# Patient Record
Sex: MALE | Race: AMERICAN INDIAN OR ALASKA NATIVE | NOT HISPANIC OR LATINO
[De-identification: names, ages, dates, MRNs, and addresses within clinical notes are randomized per-mention and may not be internally consistent; named-entity substitution may affect disease eponyms.]

---

## 2017-01-12 ENCOUNTER — OTHER (OUTPATIENT)
Age: 62
End: 2017-01-12

## 2017-01-18 ENCOUNTER — OUTPATIENT (OUTPATIENT)
Dept: OUTPATIENT SERVICES | Facility: HOSPITAL | Age: 62
LOS: 1 days | Discharge: HOME | End: 2017-01-18

## 2017-01-27 ENCOUNTER — APPOINTMENT (OUTPATIENT)
Dept: CARDIOLOGY | Facility: CLINIC | Age: 62
End: 2017-01-27

## 2017-01-27 VITALS
HEIGHT: 67 IN | DIASTOLIC BLOOD PRESSURE: 74 MMHG | BODY MASS INDEX: 27.47 KG/M2 | WEIGHT: 175 LBS | SYSTOLIC BLOOD PRESSURE: 115 MMHG

## 2017-06-23 ENCOUNTER — APPOINTMENT (OUTPATIENT)
Dept: CARDIOLOGY | Facility: CLINIC | Age: 62
End: 2017-06-23

## 2017-06-23 VITALS
HEART RATE: 74 BPM | SYSTOLIC BLOOD PRESSURE: 118 MMHG | DIASTOLIC BLOOD PRESSURE: 76 MMHG | BODY MASS INDEX: 27.47 KG/M2 | WEIGHT: 175 LBS | HEIGHT: 67 IN

## 2017-06-23 RX ORDER — EZETIMIBE AND SIMVASTATIN 10; 10 MG/1; MG/1
10-10 TABLET ORAL DAILY
Refills: 0 | Status: DISCONTINUED | COMMUNITY
End: 2017-06-23

## 2017-06-27 DIAGNOSIS — I20.9 ANGINA PECTORIS, UNSPECIFIED: ICD-10-CM

## 2017-06-27 DIAGNOSIS — I25.9 CHRONIC ISCHEMIC HEART DISEASE, UNSPECIFIED: ICD-10-CM

## 2017-07-20 ENCOUNTER — OUTPATIENT (OUTPATIENT)
Dept: OUTPATIENT SERVICES | Facility: HOSPITAL | Age: 62
LOS: 1 days | Discharge: HOME | End: 2017-07-20

## 2017-07-20 DIAGNOSIS — Z01.818 ENCOUNTER FOR OTHER PREPROCEDURAL EXAMINATION: ICD-10-CM

## 2017-07-20 DIAGNOSIS — I25.10 ATHEROSCLEROTIC HEART DISEASE OF NATIVE CORONARY ARTERY WITHOUT ANGINA PECTORIS: ICD-10-CM

## 2017-07-27 ENCOUNTER — OUTPATIENT (OUTPATIENT)
Dept: OUTPATIENT SERVICES | Facility: HOSPITAL | Age: 62
LOS: 1 days | Discharge: HOME | End: 2017-07-27

## 2017-08-02 DIAGNOSIS — I10 ESSENTIAL (PRIMARY) HYPERTENSION: ICD-10-CM

## 2017-08-02 DIAGNOSIS — Z82.49 FAMILY HISTORY OF ISCHEMIC HEART DISEASE AND OTHER DISEASES OF THE CIRCULATORY SYSTEM: ICD-10-CM

## 2017-08-02 DIAGNOSIS — R07.89 OTHER CHEST PAIN: ICD-10-CM

## 2017-08-02 DIAGNOSIS — I25.119 ATHEROSCLEROTIC HEART DISEASE OF NATIVE CORONARY ARTERY WITH UNSPECIFIED ANGINA PECTORIS: ICD-10-CM

## 2017-08-02 DIAGNOSIS — E78.4 OTHER HYPERLIPIDEMIA: ICD-10-CM

## 2017-08-14 ENCOUNTER — APPOINTMENT (OUTPATIENT)
Dept: UROLOGY | Facility: CLINIC | Age: 62
End: 2017-08-14
Payer: COMMERCIAL

## 2017-08-14 PROCEDURE — 99213 OFFICE O/P EST LOW 20 MIN: CPT

## 2017-08-14 RX ORDER — TADALAFIL 2.5 MG/1
2.5 TABLET, FILM COATED ORAL
Refills: 0 | Status: DISCONTINUED | COMMUNITY

## 2017-08-14 RX ORDER — EZETIMIBE 10 MG/1
10 TABLET ORAL DAILY
Qty: 30 | Refills: 3 | Status: DISCONTINUED | COMMUNITY
Start: 2017-06-23 | End: 2017-08-14

## 2017-08-14 RX ORDER — ROSUVASTATIN CALCIUM 10 MG/1
10 TABLET, FILM COATED ORAL
Refills: 0 | Status: DISCONTINUED | COMMUNITY

## 2017-10-11 ENCOUNTER — APPOINTMENT (OUTPATIENT)
Dept: CARDIOLOGY | Facility: CLINIC | Age: 62
End: 2017-10-11

## 2017-10-11 VITALS
BODY MASS INDEX: 27.47 KG/M2 | DIASTOLIC BLOOD PRESSURE: 70 MMHG | HEIGHT: 67 IN | HEART RATE: 71 BPM | WEIGHT: 175 LBS | SYSTOLIC BLOOD PRESSURE: 138 MMHG

## 2018-04-10 ENCOUNTER — APPOINTMENT (OUTPATIENT)
Dept: UROLOGY | Facility: CLINIC | Age: 63
End: 2018-04-10

## 2018-04-13 ENCOUNTER — APPOINTMENT (OUTPATIENT)
Dept: CARDIOLOGY | Facility: CLINIC | Age: 63
End: 2018-04-13

## 2018-04-13 VITALS
HEART RATE: 69 BPM | WEIGHT: 174 LBS | HEIGHT: 67 IN | SYSTOLIC BLOOD PRESSURE: 130 MMHG | DIASTOLIC BLOOD PRESSURE: 78 MMHG | BODY MASS INDEX: 27.31 KG/M2

## 2018-05-31 ENCOUNTER — OUTPATIENT (OUTPATIENT)
Dept: OUTPATIENT SERVICES | Facility: HOSPITAL | Age: 63
LOS: 1 days | Discharge: HOME | End: 2018-05-31

## 2018-05-31 DIAGNOSIS — I20.9 ANGINA PECTORIS, UNSPECIFIED: ICD-10-CM

## 2018-05-31 DIAGNOSIS — I25.9 CHRONIC ISCHEMIC HEART DISEASE, UNSPECIFIED: ICD-10-CM

## 2018-06-11 ENCOUNTER — APPOINTMENT (OUTPATIENT)
Dept: UROLOGY | Facility: CLINIC | Age: 63
End: 2018-06-11
Payer: COMMERCIAL

## 2018-06-11 PROCEDURE — 99215 OFFICE O/P EST HI 40 MIN: CPT

## 2018-06-11 NOTE — ASSESSMENT
[FreeTextEntry1] : His exam is essentially benign. The prostate feels to be less than 10 or 15 g. There was no areas of abnormal texture, asymmetry, or nodules. 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 He tells in the other urologist told him hydration but he never did a metabolic workup and if someone has a stone that requires them to go through surgery at least my opinion a limited noninvasive workup would be appropriate.\par \par With respect to his urination I will start from scratch with a record of his intake and output, urine analysis, culture and cytology, a renal and bladder ultrasound and consider a uroflow\par \par With respect to his high grade PIN he tells me his recent PSA was less than four, blood values by Dr. Freitas in May 2017 had a total PSA of 4.1 with a borderline hemoglobin A1C of 5.8 and no hormones were drawn. He will therefore get repeat bloods, he tells me recent PSA was less than four, if so it is stable over time go for someone of 62 years of age I would’ve preferred less than 2.5.\par \par With respect to his stone disease will get a metabolic workup times two, he will get me as a complete a set of records from his ureteroscopy treatment as he can including CAT scan, stone analysis, operative report I will review that when he comes back in\par \par With respect to his erectile dysfunction he is using 2.5 mg of Cialis and he says that is helping him. If he needs more I will need a note from Dr. Mayo that his [place] criteria is equal to “I” but he seems to be getting that from elsewhere and has enough as long as the other doctors comfortable with evaluation I have no objection.\par

## 2018-06-11 NOTE — HISTORY OF PRESENT ILLNESS
[FreeTextEntry1] : ANNMARIE was last seen by Dr. Freitas in August 2017 several years after a greenlight laser. He has had high grade PIN diagnosed a prostate biopsy years before that and has had PSA’s in the high normal range for someone in his age group. Of interest is this past winter he had a left sided stone treated in [place] with ureteroscopy and he tells me at that time the urologist pay careful attention to the anatomic configuration of the prostate and told him that he looks to have obstruction which I mean he assumes intrusive tissue means obstruction. ANNMARIE also has mild erectile dysfunction for which he takes Cialis and he is followed by Dr. Mayo with a history of two vessel disease with stable chronic ischemic heart disease.\par His current urologic issue is one a history of high grade PIN which he needs watched and his lower urinary tract symptoms which at this point he says are limited to nocturia. He wakes up at least once a night, when he does it is with a small volume and with a slow stream. At times he has to wait until he starts to void. Of note is by day he is urinating quite well, he tells me he’s got a good stream, no frequency and feels empty when he is finished. There’s been no blood, no pus no burning though given the fact that he’s made a stone he may have limited daytime symptoms due to auto dehydration on the nighttime symptoms could be due to mobilization of peripheral edema something that we will need to workup.\par  [Nocturia] : nocturia [Erectile Dysfunction] : Erectile Dysfunction

## 2018-06-11 NOTE — LETTER HEADER
[Jose Martin López MD] : Jose Martin López MD [Director of Urology] : Director of Urology [[address]] : [address] [[address]] : [address] [[place] [address]] : [place] [address] [Tel [phone]] : Tel [phone] [Fax [phone]] : Fax [phone]

## 2018-06-11 NOTE — LETTER BODY
[Dear  ___] : Dear  [unfilled], [Courtesy Letter:] : I had the pleasure of seeing your patient, [unfilled], in my office today. [Please see my note below.] : Please see my note below. [Sincerely,] : Sincerely, [FreeTextEntry2] : Miya Lynch MD\par [address]\par [place] [id number] [DrShirley  ___] : Dr. BRYANT

## 2018-06-11 NOTE — PHYSICAL EXAM
[General Appearance - Well Developed] : well developed [General Appearance - Well Nourished] : well nourished [Normal Appearance] : normal appearance [Well Groomed] : well groomed [General Appearance - In No Acute Distress] : no acute distress [Abdomen Soft] : soft [Abdomen Tenderness] : non-tender [Abdomen Hernia] : no hernia was discovered [Costovertebral Angle Tenderness] : no ~M costovertebral angle tenderness [Penis Abnormality] : normal circumcised penis [Scrotum] : the scrotum was normal [Epididymis] : the epididymides were normal [Testes Tenderness] : no tenderness of the testes [Testes Mass (___cm)] : there were no testicular masses [Anus Abnormality] : the anus and perineum were normal [Rectal Exam - Rectum] : digital rectal exam was normal [Prostate Enlargement] : the prostate was not enlarged [Prostate Tenderness] : the prostate was not tender [No Prostate Nodules] : no prostate nodules [Prostate Size ___ gm] : prostate size [unfilled] gm [Heart Rate And Rhythm] : Heart rate and rhythm were normal [Edema] : no peripheral edema [] : no respiratory distress [Respiration, Rhythm And Depth] : normal respiratory rhythm and effort [Exaggerated Use Of Accessory Muscles For Inspiration] : no accessory muscle use [Auscultation Breath Sounds / Voice Sounds] : lungs were clear to auscultation bilaterally [Oriented To Time, Place, And Person] : oriented to person, place, and time [Affect] : the affect was normal [Mood] : the mood was normal [Not Anxious] : not anxious [Normal Station and Gait] : the gait and station were normal for the patient's age [FreeTextEntry1] : DTR's & BC reflexes were intact

## 2018-09-20 ENCOUNTER — OUTPATIENT (OUTPATIENT)
Dept: OUTPATIENT SERVICES | Facility: HOSPITAL | Age: 63
LOS: 1 days | Discharge: HOME | End: 2018-09-20

## 2018-09-20 ENCOUNTER — LABORATORY RESULT (OUTPATIENT)
Age: 63
End: 2018-09-20

## 2018-09-20 ENCOUNTER — APPOINTMENT (OUTPATIENT)
Dept: UROLOGY | Facility: CLINIC | Age: 63
End: 2018-09-20
Payer: COMMERCIAL

## 2018-09-20 VITALS
HEART RATE: 66 BPM | WEIGHT: 174 LBS | BODY MASS INDEX: 27.31 KG/M2 | DIASTOLIC BLOOD PRESSURE: 74 MMHG | HEIGHT: 67 IN | SYSTOLIC BLOOD PRESSURE: 122 MMHG

## 2018-09-20 LAB
BILIRUB UR QL STRIP: NORMAL
CLARITY UR: CLEAR
COLLECTION METHOD: NORMAL
GLUCOSE UR-MCNC: NORMAL
HCG UR QL: NORMAL EU/DL
HGB UR QL STRIP.AUTO: NORMAL
KETONES UR-MCNC: NORMAL
LEUKOCYTE ESTERASE UR QL STRIP: NORMAL
NITRITE UR QL STRIP: NORMAL
PH UR STRIP: 7
PROT UR STRIP-MCNC: NORMAL
SP GR UR STRIP: 1.01

## 2018-09-20 PROCEDURE — 51741 ELECTRO-UROFLOWMETRY FIRST: CPT

## 2018-09-20 PROCEDURE — 81003 URINALYSIS AUTO W/O SCOPE: CPT | Mod: QW

## 2018-09-20 PROCEDURE — 51798 US URINE CAPACITY MEASURE: CPT

## 2018-09-20 PROCEDURE — 99214 OFFICE O/P EST MOD 30 MIN: CPT | Mod: 25

## 2018-09-20 NOTE — LETTER HEADER
[FreeTextEntry3] : Jose Martin López M.D.\par Director of Urology\par [facility]\par [address] [place] [address], [address]\[place] [address]

## 2018-09-20 NOTE — LETTER BODY
[Dear  ___] : Dear  [unfilled], [Courtesy Letter:] : I had the pleasure of seeing your patient, [unfilled], in my office today. [Please see my note below.] : Please see my note below. [Sincerely,] : Sincerely, [FreeTextEntry2] : Miya Lynch MD\par [address]\par [place] [id number] \par  [DrShirley  ___] : Dr. BRYANT

## 2018-09-20 NOTE — HISTORY OF PRESENT ILLNESS
[FreeTextEntry1] : Ryan was last seen June 11, 2018. He’d had a greenlight laser done about 2014 and his PSA’s had been in the high normal range after that. He also had a kidney stone treated recently and that the urologist who did the ureteroscopy had told him he had intrusive tissue. He has mild ED treated with Cialis and stable chronic ischemic heart disease. He still had mild lower urinary tract symptoms so we decided he would keep a record of his intake and output so we could see what of this is behavior related, because of the stones he was doing going to get a metabolic workup and a renal bladder ultrasound for both of the issues. Depending on the results he would do a uroflow. He came in today and essentially he’s done nothing. He had lots of good excuses but without that I cannot tell him what’s going on. When he came in he had a rather full bladder so we decided to go ahead with the flow study please see that result in a separate sheet. [Nocturia] : nocturia [Erectile Dysfunction] : Erectile Dysfunction [None] : None

## 2018-09-20 NOTE — ASSESSMENT
[FreeTextEntry1] : We discussed the need for data in order to help him and in fact about 15 minutes after the flow study was finished she had a severe urge to go so we had him void into a container he voided 450 mL. This tells me that a lot of his symptoms may be behavioral i.e. if you drink a lot you will urinate a lot though he has to drink a lot in order to avoid stones. \par \par I reviewed with him why I need the data to help him so since then going to reorder everything I ordered last time except for the flow study which was done today was good in specific will keep a record of his intake and output, he will get the 24-hour urine in two different occasions, he will get hormone studies as well as a PSA, I will get the renal and bladder ultrasound. Once the testing is done he will come back in. In the meantime he will continue with the Cialis on an as-needed basis.\par

## 2018-09-20 NOTE — PHYSICAL EXAM
[General Appearance - Well Developed] : well developed [General Appearance - Well Nourished] : well nourished [Normal Appearance] : normal appearance [Well Groomed] : well groomed [General Appearance - In No Acute Distress] : no acute distress [Abdomen Soft] : soft [Abdomen Tenderness] : non-tender [Costovertebral Angle Tenderness] : no ~M costovertebral angle tenderness [] : no respiratory distress [Respiration, Rhythm And Depth] : normal respiratory rhythm and effort [Exaggerated Use Of Accessory Muscles For Inspiration] : no accessory muscle use [Oriented To Time, Place, And Person] : oriented to person, place, and time [Affect] : the affect was normal [Mood] : the mood was normal [Not Anxious] : not anxious [Normal Station and Gait] : the gait and station were normal for the patient's age

## 2018-09-21 ENCOUNTER — LABORATORY RESULT (OUTPATIENT)
Age: 63
End: 2018-09-21

## 2018-09-21 DIAGNOSIS — N52.9 MALE ERECTILE DYSFUNCTION, UNSPECIFIED: ICD-10-CM

## 2018-10-03 ENCOUNTER — OUTPATIENT (OUTPATIENT)
Dept: OUTPATIENT SERVICES | Facility: HOSPITAL | Age: 63
LOS: 1 days | Discharge: HOME | End: 2018-10-03

## 2018-10-03 DIAGNOSIS — R35.1 NOCTURIA: ICD-10-CM

## 2018-10-19 ENCOUNTER — APPOINTMENT (OUTPATIENT)
Dept: CARDIOLOGY | Facility: CLINIC | Age: 63
End: 2018-10-19

## 2018-10-19 VITALS
HEART RATE: 72 BPM | DIASTOLIC BLOOD PRESSURE: 72 MMHG | SYSTOLIC BLOOD PRESSURE: 120 MMHG | BODY MASS INDEX: 27.31 KG/M2 | WEIGHT: 174 LBS | HEIGHT: 67 IN

## 2019-03-27 ENCOUNTER — APPOINTMENT (OUTPATIENT)
Dept: UROLOGY | Facility: CLINIC | Age: 64
End: 2019-03-27
Payer: COMMERCIAL

## 2019-03-27 VITALS
HEART RATE: 85 BPM | BODY MASS INDEX: 27.31 KG/M2 | DIASTOLIC BLOOD PRESSURE: 61 MMHG | SYSTOLIC BLOOD PRESSURE: 98 MMHG | HEIGHT: 67 IN | WEIGHT: 174 LBS

## 2019-03-27 DIAGNOSIS — N20.0 CALCULUS OF KIDNEY: ICD-10-CM

## 2019-03-27 PROCEDURE — 99214 OFFICE O/P EST MOD 30 MIN: CPT

## 2019-03-27 RX ORDER — TADALAFIL 5 MG/1
TABLET, FILM COATED ORAL
Refills: 0 | Status: COMPLETED | COMMUNITY
End: 2019-03-27

## 2019-03-27 NOTE — ASSESSMENT
[FreeTextEntry1] : I’m willing to trust him with a months worth of Cialis so he knows he won’t get any more until he gets me explicit approval from his cardiologist. As far as his high-grade pin I will reorder the PSA and depending on the results for have perhaps have him see Dr. Robins. With respect to his history of stone disease he has too many problems wrong with his metabolic workup him to recommend he see a nephrologist. Currently he does not appear to have new stones.

## 2019-03-27 NOTE — HISTORY OF PRESENT ILLNESS
[FreeTextEntry1] : Ryan had last been seen in October. He has a history of high-grade pin so we ordered a PSA. He recently passed the stone treated elsewhere so we ordered a metabolic workup and a renal ultrasound. He has ED and wanted a renewal on Cialis but we hadn’t had hormones or [place] criteria so we ordered those. He had the bloods drawn on October 1 but only a limited sample of what we wanted. He had a metabolic workup done in September and he saw his cardiologist in October but did not give us the [place] criteria. He soon enough to see what can be done. Of note is he tells me yesterday he woke up with an erection did not use it but ended up with severe pain in the back of his head. 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 He takes daily Cialis for its effect on his urination as when he tried alpha blockade he says it bothers him. [Nocturia] : nocturia [Erectile Dysfunction] : Erectile Dysfunction [None] : None

## 2019-03-27 NOTE — LETTER BODY
[Dear  ___] : Dear  [unfilled], [Courtesy Letter:] : I had the pleasure of seeing your patient, [unfilled], in my office today. [Please see my note below.] : Please see my note below. [Sincerely,] : Sincerely, [FreeTextEntry2] : Miya Lynch MD\par [address]\par [place] [id number] \par

## 2019-04-10 ENCOUNTER — FORM ENCOUNTER (OUTPATIENT)
Age: 64
End: 2019-04-10

## 2019-04-11 ENCOUNTER — OUTPATIENT (OUTPATIENT)
Dept: OUTPATIENT SERVICES | Facility: HOSPITAL | Age: 64
LOS: 1 days | Discharge: HOME | End: 2019-04-11
Payer: COMMERCIAL

## 2019-04-11 DIAGNOSIS — N20.0 CALCULUS OF KIDNEY: ICD-10-CM

## 2019-04-11 PROCEDURE — 76775 US EXAM ABDO BACK WALL LIM: CPT | Mod: 26

## 2019-04-19 ENCOUNTER — APPOINTMENT (OUTPATIENT)
Dept: CARDIOLOGY | Facility: CLINIC | Age: 64
End: 2019-04-19
Payer: COMMERCIAL

## 2019-04-19 VITALS
HEIGHT: 67 IN | DIASTOLIC BLOOD PRESSURE: 64 MMHG | BODY MASS INDEX: 27.62 KG/M2 | SYSTOLIC BLOOD PRESSURE: 118 MMHG | WEIGHT: 176 LBS | HEART RATE: 68 BPM

## 2019-04-19 PROCEDURE — 99213 OFFICE O/P EST LOW 20 MIN: CPT

## 2019-04-19 PROCEDURE — 93000 ELECTROCARDIOGRAM COMPLETE: CPT

## 2019-04-19 RX ORDER — TADALAFIL 5 MG/1
5 TABLET ORAL
Qty: 30 | Refills: 0 | Status: DISCONTINUED | COMMUNITY
Start: 2019-03-27 | End: 2019-04-19

## 2019-04-19 NOTE — REASON FOR VISIT
[Follow-Up - Clinic] : a clinic follow-up of [Angina Pectoris] : angina pectoris [FreeTextEntry2] : cad

## 2019-04-19 NOTE — ASSESSMENT
[FreeTextEntry1] : 62 yo male with pmhx and presentation as above\par cad/dyslipidemia/htn\par labs reviewed, all at goal\par 2018 stress MPI  - low risk, cont med rx for cad\par cont with aggressive risk modif for cad\par daily exercise \par diet and act as tolerated\par rtc 6 months

## 2019-04-19 NOTE — HISTORY OF PRESENT ILLNESS
[FreeTextEntry1] : 62 yo male with pmhx as below is here for a f/up visit\par 2017 s/p lhc - mod 2v cad - lad and rca - med rx\par on high dose statin, 2018 s/p stress mpi - low risk \par et is stable\par no major cvs complains\par ros is otherwise unrem\par complaint with meds and diet

## 2019-04-19 NOTE — PHYSICAL EXAM
[General Appearance - Well Developed] : well developed [Normal Appearance] : normal appearance [Well Groomed] : well groomed [General Appearance - Well Nourished] : well nourished [No Deformities] : no deformities [General Appearance - In No Acute Distress] : no acute distress [Normal Oral Mucosa] : normal oral mucosa [Normal Jugular Venous A Waves Present] : normal jugular venous A waves present [Normal Jugular Venous V Waves Present] : normal jugular venous V waves present [No Jugular Venous Erwin A Waves] : no jugular venous erwin A waves [Respiration, Rhythm And Depth] : normal respiratory rhythm and effort [Exaggerated Use Of Accessory Muscles For Inspiration] : no accessory muscle use [Auscultation Breath Sounds / Voice Sounds] : lungs were clear to auscultation bilaterally [Heart Rate And Rhythm] : heart rate and rhythm were normal [Heart Sounds] : normal S1 and S2 [Murmurs] : no murmurs present [Abdomen Soft] : soft [Abdomen Tenderness] : non-tender [Abdomen Mass (___ Cm)] : no abdominal mass palpated [Nail Clubbing] : no clubbing of the fingernails [Cyanosis, Localized] : no localized cyanosis [Petechial Hemorrhages (___cm)] : no petechial hemorrhages [] : no ischemic changes [Skin Color & Pigmentation] : normal skin color and pigmentation

## 2019-05-22 ENCOUNTER — APPOINTMENT (OUTPATIENT)
Dept: UROLOGY | Facility: CLINIC | Age: 64
End: 2019-05-22
Payer: COMMERCIAL

## 2019-05-22 VITALS
HEART RATE: 79 BPM | DIASTOLIC BLOOD PRESSURE: 77 MMHG | WEIGHT: 176 LBS | BODY MASS INDEX: 27.62 KG/M2 | HEIGHT: 67 IN | SYSTOLIC BLOOD PRESSURE: 117 MMHG

## 2019-05-22 PROCEDURE — 99214 OFFICE O/P EST MOD 30 MIN: CPT

## 2019-05-22 NOTE — HISTORY OF PRESENT ILLNESS
[FreeTextEntry1] : Ryan has and follow through this office for many years. He has a prior biopsy elsewhere that was high great pin, we are following his PSA which has been going up and down between five and four. The most recent one done in April came back at 4.2 the one before that in March came back at five unfortunately neither one had a free PSA. We also checked his testosterone level and he’s here to review that.\par \par He has voiding dysfunction and has been taking tadalafil at 2.5 mg a day. If he ever wants to have sex he would just pop two or three at one time. I wanted him to get the 5 mg tablets but he says his insurance will cover that.\par  [Urinary Urgency] : urinary urgency [Urinary Frequency] : urinary frequency [Nocturia] : nocturia [Erectile Dysfunction] : Erectile Dysfunction

## 2019-05-22 NOTE — ASSESSMENT
[FreeTextEntry1] : Physical exam shows no signs of hernia. This no atrophy of us testicles and his FORREST was benign with perhaps a 20 or 30 g prostate by FORREST. (I know that his prior sono show a prostate of over 50 g).\par \par He has borderline low testosterone we will repeat that and see if it is real. We discussed the reasons for and against the 4K score and we will get that done. In the interim I will renew his tadalafil both for his voiding as well as his erectile dysfunction.\par

## 2019-05-22 NOTE — LETTER BODY
[Dear  ___] : Dear  [unfilled], [Courtesy Letter:] : I had the pleasure of seeing your patient, [unfilled], in my office today. [Please see my note below.] : Please see my note below. [Sincerely,] : Sincerely, [FreeTextEntry2] : Miya Lynch MD\par [address]\par [place] [id number] \par

## 2019-05-22 NOTE — PHYSICAL EXAM
[General Appearance - Well Developed] : well developed [General Appearance - Well Nourished] : well nourished [Normal Appearance] : normal appearance [Well Groomed] : well groomed [General Appearance - In No Acute Distress] : no acute distress [Abdomen Soft] : soft [Abdomen Tenderness] : non-tender [Abdomen Hernia] : no hernia was discovered [Costovertebral Angle Tenderness] : no ~M costovertebral angle tenderness [Urethral Meatus] : meatus normal [Testes Tenderness] : no tenderness of the testes [Testes Mass (___cm)] : there were no testicular masses [Anus Abnormality] : the anus and perineum were normal [Rectal Exam - Rectum] : digital rectal exam was normal [Prostate Enlargement] : the prostate was not enlarged [Prostate Tenderness] : the prostate was not tender [Prostate Size ___ gm] : prostate size [unfilled] gm [Edema] : no peripheral edema [] : no respiratory distress [Respiration, Rhythm And Depth] : normal respiratory rhythm and effort [Exaggerated Use Of Accessory Muscles For Inspiration] : no accessory muscle use [Oriented To Time, Place, And Person] : oriented to person, place, and time [Affect] : the affect was normal [Mood] : the mood was normal [Not Anxious] : not anxious [Normal Station and Gait] : the gait and station were normal for the patient's age

## 2019-05-22 NOTE — LETTER HEADER
[FreeTextEntry3] : Jose Martin López M.D.\par Director of Urology\par [facility]\par [address] [place] [address], [address]\[place] [address]

## 2019-07-31 ENCOUNTER — APPOINTMENT (OUTPATIENT)
Dept: UROLOGY | Facility: CLINIC | Age: 64
End: 2019-07-31
Payer: COMMERCIAL

## 2019-07-31 VITALS
WEIGHT: 176 LBS | HEART RATE: 64 BPM | HEIGHT: 67 IN | DIASTOLIC BLOOD PRESSURE: 76 MMHG | BODY MASS INDEX: 27.62 KG/M2 | SYSTOLIC BLOOD PRESSURE: 120 MMHG

## 2019-07-31 PROCEDURE — 99214 OFFICE O/P EST MOD 30 MIN: CPT

## 2019-07-31 NOTE — PHYSICAL EXAM
[General Appearance - Well Developed] : well developed [Normal Appearance] : normal appearance [General Appearance - Well Nourished] : well nourished [Well Groomed] : well groomed [General Appearance - In No Acute Distress] : no acute distress [] : no respiratory distress [Edema] : no peripheral edema [Exaggerated Use Of Accessory Muscles For Inspiration] : no accessory muscle use [Respiration, Rhythm And Depth] : normal respiratory rhythm and effort [Affect] : the affect was normal [Oriented To Time, Place, And Person] : oriented to person, place, and time [Mood] : the mood was normal [Not Anxious] : not anxious [Normal Station and Gait] : the gait and station were normal for the patient's age [No Focal Deficits] : no focal deficits

## 2019-07-31 NOTE — LETTER BODY
[Dear  ___] : Dear  [unfilled], [Courtesy Letter:] : I had the pleasure of seeing your patient, [unfilled], in my office today. [Please see my note below.] : Please see my note below. [Sincerely,] : Sincerely, [FreeTextEntry2] : Miya Lynch MD\par [address]\par [place] [id number] \par

## 2019-07-31 NOTE — LETTER HEADER
[FreeTextEntry3] : Jose Martin López M.D.\par Director of Urology\par [facility]\par [address] [place] [address], [address]\[place] [address]

## 2019-07-31 NOTE — ASSESSMENT
[FreeTextEntry1] : His 4K score is 2% representing low risk of aggressive prostate cancer. He understands this test does not rule out prostate cancer. Rather he gives the estimation of the risk of clinically significant prostate cancer and in this case the risk of cancer is low enough that the risk of biopsy is not warranted. I reviewed the options and elected for observation. We will repeat PSA in a year.\par \par His hormone panel is within normal limits and there is no indication for testosterone replacement therapy at this time. He'll continue Cialis 2.5 mg for both ED and bothersome lower urinary tract symptoms. We understand his desire for the drug of youth that he sees advertised throughout but juicing patients is not safe and not ethical and I will do it. Sometimes good enough is good enough and he accepted that explanation.\par \par We will see him in 6 months for symptom index.\par

## 2019-07-31 NOTE — HISTORY OF PRESENT ILLNESS
[Urinary Urgency] : urinary urgency [Urinary Frequency] : urinary frequency [Nocturia] : nocturia [Erectile Dysfunction] : Erectile Dysfunction [FreeTextEntry1] : \nasrin Davis Is a 63-year-old male we've been following for BPH, elevated PSA, and erectile dysfunction.\par \par He had a prior biopsy, by his former urologist, that revealed high-grade PIN. His PSA has been between 5-4 range. His most recent was 4.2 in April 2019. All options were reviewed and patient elected to obtain a 4K score. He presents for review today.\par \par He also has a history of voiding dysfunction/erectile dysfunction has been managed on Cialis 2.5 mg daily. Sometimes he takes 2-3 tablets if he is having intercourse. His insurance will not cover 5 mg tablets. There was question of borderline testosterone levels with elevated LH. We recommended repeat hormone panel to confirm. He presents to review.\par

## 2019-10-18 ENCOUNTER — APPOINTMENT (OUTPATIENT)
Dept: CARDIOLOGY | Facility: CLINIC | Age: 64
End: 2019-10-18
Payer: COMMERCIAL

## 2019-10-18 ENCOUNTER — OTHER (OUTPATIENT)
Age: 64
End: 2019-10-18

## 2019-10-18 VITALS
WEIGHT: 175 LBS | HEART RATE: 80 BPM | HEIGHT: 67 IN | DIASTOLIC BLOOD PRESSURE: 78 MMHG | BODY MASS INDEX: 27.47 KG/M2 | SYSTOLIC BLOOD PRESSURE: 126 MMHG

## 2019-10-18 PROCEDURE — 93000 ELECTROCARDIOGRAM COMPLETE: CPT

## 2019-10-18 PROCEDURE — 99214 OFFICE O/P EST MOD 30 MIN: CPT

## 2019-10-18 NOTE — HISTORY OF PRESENT ILLNESS
[FreeTextEntry1] : 63 yo male with pmhx as below is here for a f/up visit\par 2017 s/p lhc - mod 2v cad - lad and rca - med rx\par on high dose statin, 2018 s/p stress mpi - low risk \par et is stable\par on and off cp last few months\par no other cvs complains\par ros is otherwise unrem\par complaint with meds and diet

## 2019-10-18 NOTE — ASSESSMENT
[FreeTextEntry1] : 65 yo male with pmhx and presentation as above\par cad/dyslipidemia/htn\par repeat labs\par set up for stress echo, if low risk - med rx, mod to high risk - cath\par cont with aggressive risk modif for cad\par daily exercise \par diet and act as tolerated\par rtc after stress test

## 2020-01-15 ENCOUNTER — APPOINTMENT (OUTPATIENT)
Dept: CARDIOLOGY | Facility: CLINIC | Age: 65
End: 2020-01-15
Payer: COMMERCIAL

## 2020-01-15 PROCEDURE — 93351 STRESS TTE COMPLETE: CPT

## 2020-01-15 PROCEDURE — 93325 DOPPLER ECHO COLOR FLOW MAPG: CPT

## 2020-01-15 PROCEDURE — 93320 DOPPLER ECHO COMPLETE: CPT

## 2020-01-24 ENCOUNTER — APPOINTMENT (OUTPATIENT)
Dept: CARDIOLOGY | Facility: CLINIC | Age: 65
End: 2020-01-24
Payer: COMMERCIAL

## 2020-01-24 VITALS
SYSTOLIC BLOOD PRESSURE: 128 MMHG | WEIGHT: 180 LBS | HEART RATE: 73 BPM | HEIGHT: 67 IN | DIASTOLIC BLOOD PRESSURE: 80 MMHG | BODY MASS INDEX: 28.25 KG/M2

## 2020-01-24 PROCEDURE — 99214 OFFICE O/P EST MOD 30 MIN: CPT

## 2020-01-24 NOTE — PHYSICAL EXAM
[General Appearance - Well Developed] : well developed [Normal Appearance] : normal appearance [Well Groomed] : well groomed [General Appearance - Well Nourished] : well nourished [No Deformities] : no deformities [Normal Oral Mucosa] : normal oral mucosa [General Appearance - In No Acute Distress] : no acute distress [Normal Jugular Venous A Waves Present] : normal jugular venous A waves present [Normal Jugular Venous V Waves Present] : normal jugular venous V waves present [No Jugular Venous Erwin A Waves] : no jugular venous erwin A waves [Heart Rate And Rhythm] : heart rate and rhythm were normal [Murmurs] : no murmurs present [Heart Sounds] : normal S1 and S2 [Exaggerated Use Of Accessory Muscles For Inspiration] : no accessory muscle use [Respiration, Rhythm And Depth] : normal respiratory rhythm and effort [Auscultation Breath Sounds / Voice Sounds] : lungs were clear to auscultation bilaterally [Abdomen Tenderness] : non-tender [Abdomen Soft] : soft [Abdomen Mass (___ Cm)] : no abdominal mass palpated [Nail Clubbing] : no clubbing of the fingernails [Cyanosis, Localized] : no localized cyanosis [Petechial Hemorrhages (___cm)] : no petechial hemorrhages [] : no ischemic changes [Skin Color & Pigmentation] : normal skin color and pigmentation

## 2020-01-24 NOTE — REASON FOR VISIT
[Angina Pectoris] : angina pectoris [Follow-Up - Clinic] : a clinic follow-up of [FreeTextEntry2] : cad

## 2020-01-24 NOTE — ASSESSMENT
[FreeTextEntry1] : 65 yo male with pmhx and presentation as above\par cad/dyslipidemia/htn\par repeat labs reviewed, a1c and FBG noted, diet discussed\par stress echo - low risk  -med mx\par cont with aggressive risk modif for cad\par daily exercise \par diet and act as tolerated\par rtc 6 months

## 2020-02-03 ENCOUNTER — APPOINTMENT (OUTPATIENT)
Dept: UROLOGY | Facility: CLINIC | Age: 65
End: 2020-02-03
Payer: COMMERCIAL

## 2020-02-03 VITALS
HEART RATE: 79 BPM | BODY MASS INDEX: 28.25 KG/M2 | SYSTOLIC BLOOD PRESSURE: 127 MMHG | WEIGHT: 180 LBS | HEIGHT: 67 IN | DIASTOLIC BLOOD PRESSURE: 84 MMHG

## 2020-02-03 PROCEDURE — 99213 OFFICE O/P EST LOW 20 MIN: CPT

## 2020-02-03 NOTE — LETTER HEADER
[FreeTextEntry3] : Jose Martin López M.D.\par Director of Urology\par [facility]\par [address] [place] [address], [address]\[place] [address]

## 2020-02-03 NOTE — LETTER BODY
[Dear  ___] : Dear  [unfilled], [Please see my note below.] : Please see my note below. [Courtesy Letter:] : I had the pleasure of seeing your patient, [unfilled], in my office today. [Sincerely,] : Sincerely, [FreeTextEntry2] : Miya Lynch MD\par [address]\par [place] [id number] \par

## 2020-02-03 NOTE — HISTORY OF PRESENT ILLNESS
[Urinary Urgency] : urinary urgency [Urinary Frequency] : urinary frequency [Nocturia] : nocturia [Erectile Dysfunction] : Erectile Dysfunction [FreeTextEntry1] : Ryan Is a 63-year-old male we've been following for BPH, elevated PSA, and erectile dysfunction. He is status post green light laser, by Dr. Freitas, approximate 5 years ago.\par \par He had a prior biopsy, by his former urologist, that revealed high-grade PIN. His PSA has been around 5.4 range. His most recent was 5.5 in November 2019. 4K score was 2% and after a review of all the options he elected for observation.\par \par He has a history of voiding dysfunction/erectile dysfunction which has been managed on Cialis 2.5 mg daily and 10 mg Cialis before intercourse. His hormone panel was within normal limits. \par \par Today he presents complaining of a somewhat worsening nocturia with urgency and poor stream, despite Cialis 2.5 mg daily. He wants to know if we can up the Cialis 25 mg daily.ax

## 2020-02-03 NOTE — ASSESSMENT
[FreeTextEntry1] : He tells us that his urinary symptoms have become worse since the last time he saw us, despite Cialis 2.5 mg daily. He will keep a voiding diary and follow up for possible uroflow study, at that time we'll discuss options depending on results.\par \par With respect to his erectile dysfunction he will continue Cialis 10 mg as needed prior to intercourse. We sent a script for 20 mg tablets that he will cut in half.\par \par He will follow up next week for further evaluation\par

## 2020-02-10 ENCOUNTER — APPOINTMENT (OUTPATIENT)
Dept: UROLOGY | Facility: CLINIC | Age: 65
End: 2020-02-10
Payer: COMMERCIAL

## 2020-02-10 VITALS
BODY MASS INDEX: 28.25 KG/M2 | HEIGHT: 67 IN | WEIGHT: 180 LBS | SYSTOLIC BLOOD PRESSURE: 126 MMHG | HEART RATE: 74 BPM | DIASTOLIC BLOOD PRESSURE: 80 MMHG

## 2020-02-10 DIAGNOSIS — N42.31 PROSTATIC INTRAEPITHELIAL NEOPLASIA: ICD-10-CM

## 2020-02-10 PROCEDURE — 99213 OFFICE O/P EST LOW 20 MIN: CPT | Mod: 25

## 2020-02-10 PROCEDURE — 51741 ELECTRO-UROFLOWMETRY FIRST: CPT

## 2020-02-10 PROCEDURE — 51798 US URINE CAPACITY MEASURE: CPT

## 2020-02-10 NOTE — HISTORY OF PRESENT ILLNESS
[Urinary Urgency] : urinary urgency [Erectile Dysfunction] : Erectile Dysfunction [Urinary Frequency] : urinary frequency [Nocturia] : nocturia [FreeTextEntry1] : Ryan Is a 63-year-old male we've been following for BPH, elevated PSA, and erectile dysfunction. He is status post green light laser, by Dr. Freitas, approximate 5 years ago.\par \par He has a history of elevated PSA with high-grade PIN and had a 4K score of 2%. He has elected for observation.\par \par At his last visit he was complaining of worsening lower urinary tract symptoms consisting of nocturia with decreased force of stream. He is been on Cialis 2.5 mg for minor urinary symptoms however, his symptoms have progressed to worsening nocturia, poor stream, and urinary urgency.\par \par He presents today to review his voiding diary and if indicated proceed to a uroflow study.\par

## 2020-02-10 NOTE — LETTER HEADER
[FreeTextEntry3] : Jose Martin López M.D.\par Director of Urology\par [facility]\par [address] [place] [address], [address]\[place] [address]

## 2020-02-10 NOTE — PHYSICAL EXAM
[General Appearance - Well Developed] : well developed [Normal Appearance] : normal appearance [General Appearance - Well Nourished] : well nourished [General Appearance - In No Acute Distress] : no acute distress [Well Groomed] : well groomed [Edema] : no peripheral edema [] : no respiratory distress [Respiration, Rhythm And Depth] : normal respiratory rhythm and effort [Oriented To Time, Place, And Person] : oriented to person, place, and time [Exaggerated Use Of Accessory Muscles For Inspiration] : no accessory muscle use [Not Anxious] : not anxious [Affect] : the affect was normal [Mood] : the mood was normal [Normal Station and Gait] : the gait and station were normal for the patient's age [No Focal Deficits] : no focal deficits

## 2020-02-10 NOTE — LETTER BODY
[Dear  ___] : Dear  [unfilled], [Sincerely,] : Sincerely, [Please see my note below.] : Please see my note below. [Courtesy Letter:] : I had the pleasure of seeing your patient, [unfilled], in my office today. [FreeTextEntry2] : Miya Lynch MD\par [address]\par [place] [id number] \par

## 2020-02-10 NOTE — ASSESSMENT
[FreeTextEntry1] : His voiding pattern is poor his symptoms are worsening. We discussed options such as other forms of non surgical intervention such as alpha blockers however, patient is declining. He wishes to undergo urodynamic testing for possible minimally and or maximally  invasive surgical procedures. All aspects of urodynamic testing were reviewed and he understands to get a urine culture one week prior to the study

## 2020-02-11 LAB
APPEARANCE: CLEAR
BILIRUBIN URINE: NEGATIVE
BLOOD URINE: NEGATIVE
COLOR: COLORLESS
GLUCOSE QUALITATIVE U: NEGATIVE
KETONES URINE: NEGATIVE
LEUKOCYTE ESTERASE URINE: NEGATIVE
NITRITE URINE: NEGATIVE
PH URINE: 7
PROTEIN URINE: NEGATIVE
SPECIFIC GRAVITY URINE: 1
UROBILINOGEN URINE: NORMAL

## 2020-02-12 LAB
BACTERIA UR CULT: NORMAL
URINE CYTOLOGY: NORMAL

## 2020-07-28 ENCOUNTER — APPOINTMENT (OUTPATIENT)
Dept: CARDIOLOGY | Facility: CLINIC | Age: 65
End: 2020-07-28

## 2020-08-11 ENCOUNTER — APPOINTMENT (OUTPATIENT)
Dept: CARDIOLOGY | Facility: CLINIC | Age: 65
End: 2020-08-11

## 2021-08-09 ENCOUNTER — APPOINTMENT (OUTPATIENT)
Dept: UROLOGY | Facility: CLINIC | Age: 66
End: 2021-08-09
Payer: COMMERCIAL

## 2021-08-09 VITALS
SYSTOLIC BLOOD PRESSURE: 152 MMHG | DIASTOLIC BLOOD PRESSURE: 96 MMHG | HEART RATE: 76 BPM | BODY MASS INDEX: 28.41 KG/M2 | WEIGHT: 181 LBS | HEIGHT: 67 IN

## 2021-08-09 PROCEDURE — 99214 OFFICE O/P EST MOD 30 MIN: CPT

## 2021-08-09 NOTE — HISTORY OF PRESENT ILLNESS
[FreeTextEntry1] : Ryan is a 65-year-old male born August 16, 1955 last seen in February 2020 to a large extent because of Covid that we have been seeing patients full-time now for quite some time.  He has been using tadalafil taking 20 mg tablets and cutting them in 3 0 for his and when he takes the "low-dose" tadalafil he says his urination is normal.  If he runs out if he gets the symptoms come back.  The 97 sexual activity he takes another half a pill as a boost and he says with that is not having any trouble.  Please note he had a history of high-grade PIN but he had a low 4K back in 2019.  Because of his poor voiding pattern we had discussed urodynamic testing for possible minimally invasive surgical procedures at this point though he says he is doing well enough on the tadalafil there is really want to consider surgery\par \par Please note his last PSA that I have was 5.5 in November 2019\par \par He also thinks his testosterone is low but in June 2019 the free testosterone was well within normal limits at 62 [Urinary Urgency] : urinary urgency [Urinary Frequency] : urinary frequency [Nocturia] : nocturia [Erectile Dysfunction] : Erectile Dysfunction

## 2021-08-09 NOTE — LETTER HEADER
[FreeTextEntry3] : Jose Martin López M.D.\par Director of Urology\par [facility]\par [address] [place] [address], [address]\[place] [address]

## 2021-08-09 NOTE — PHYSICAL EXAM
[General Appearance - Well Developed] : well developed [General Appearance - Well Nourished] : well nourished [Normal Appearance] : normal appearance [Well Groomed] : well groomed [General Appearance - In No Acute Distress] : no acute distress [Abdomen Soft] : soft [Abdomen Tenderness] : non-tender [Abdomen Hernia] : no hernia was discovered [Costovertebral Angle Tenderness] : no ~M costovertebral angle tenderness [Urethral Meatus] : meatus normal [Penis Abnormality] : normal circumcised penis [Epididymis] : the epididymides were normal [Testes Tenderness] : no tenderness of the testes [Heart Rate And Rhythm] : Heart rate and rhythm were normal [Edema] : no peripheral edema [] : no respiratory distress [Respiration, Rhythm And Depth] : normal respiratory rhythm and effort [Exaggerated Use Of Accessory Muscles For Inspiration] : no accessory muscle use [Auscultation Breath Sounds / Voice Sounds] : lungs were clear to auscultation bilaterally [Oriented To Time, Place, And Person] : oriented to person, place, and time [Affect] : the affect was normal [Mood] : the mood was normal [Not Anxious] : not anxious [Normal Station and Gait] : the gait and station were normal for the patient's age [FreeTextEntry1] : Deep tendon reflexes were symmetrical [Inguinal Lymph Nodes Enlarged Bilaterally] : inguinal

## 2021-08-09 NOTE — LETTER BODY
[Dear  ___] : Dear  [unfilled], [Courtesy Letter:] : I had the pleasure of seeing your patient, [unfilled], in my office today. [Please see my note below.] : Please see my note below. [Sincerely,] : Sincerely, [FreeTextEntry2] : Miya Lynch MD\par [address]\par [place] [id number] \par

## 2021-08-09 NOTE — ASSESSMENT
[FreeTextEntry1] : Is fairly stable on long-term tadalafil.  He adjust the dose depending on whether or not he is attempting sexual activity which is fine we had not had a PSA in a while and he is pretty sure his testosterone is low so we will order a set of bloods and have him come back for review.  The meantime we will reorder the tadalafil

## 2021-08-31 ENCOUNTER — APPOINTMENT (OUTPATIENT)
Dept: UROLOGY | Facility: CLINIC | Age: 66
End: 2021-08-31

## 2021-10-12 ENCOUNTER — APPOINTMENT (OUTPATIENT)
Dept: UROLOGY | Facility: CLINIC | Age: 66
End: 2021-10-12
Payer: COMMERCIAL

## 2021-10-12 VITALS
BODY MASS INDEX: 28.56 KG/M2 | HEART RATE: 77 BPM | WEIGHT: 182 LBS | HEIGHT: 67 IN | DIASTOLIC BLOOD PRESSURE: 79 MMHG | SYSTOLIC BLOOD PRESSURE: 126 MMHG

## 2021-10-12 DIAGNOSIS — N13.8 BENIGN PROSTATIC HYPERPLASIA WITH LOWER URINARY TRACT SYMPMS: ICD-10-CM

## 2021-10-12 DIAGNOSIS — N40.1 BENIGN PROSTATIC HYPERPLASIA WITH LOWER URINARY TRACT SYMPMS: ICD-10-CM

## 2021-10-12 DIAGNOSIS — R79.89 OTHER SPECIFIED ABNORMAL FINDINGS OF BLOOD CHEMISTRY: ICD-10-CM

## 2021-10-12 PROCEDURE — 99214 OFFICE O/P EST MOD 30 MIN: CPT

## 2021-10-12 RX ORDER — TADALAFIL 2.5 MG/1
2.5 TABLET, FILM COATED ORAL
Qty: 90 | Refills: 1 | Status: COMPLETED | COMMUNITY
Start: 2019-03-27 | End: 2021-10-12

## 2021-10-12 NOTE — ASSESSMENT
[FreeTextEntry1] : Past values\par June 21, 2019 PSA equals 4 0.14/14%, 4K equals 2%\par November 14, 2019 PSA equals 5.5\par August 11, 2021 PSA  equals 4.6/12.2%\par \par His  PSA velocity, his rate of rise, is quite small but he is a young man and would like to be sure he does not have it.  I will send him for an MRI pre and post gadolinium and have him come back to see  to decide if he needs a biopsy.\par \par As far as his testosterone ii is within normal limits, he has a normal libido though he feels fatigued. Replacing testosterone in the presence of normal values does not help fatigue and he will have to speak to his PCP for other options\par \par As far as his erections he is taking daily Cialis at the 5 mg dose to help void. if he would have a willing partner his penis responds and though I can help him with Cialis I can't help him find a partner.

## 2021-10-12 NOTE — LETTER HEADER
[FreeTextEntry3] : Jose Martin López M.D.\par Director of Urology\par [facility]\par [address] [place] [address], [address]\[place] [address]

## 2021-10-12 NOTE — HISTORY OF PRESENT ILLNESS
[FreeTextEntry1] : Ryan is a 66-year-old male we have been following for many years.  He has mild BPH controlled with daily Cialis that also is taking care of his erectile dysfunction if he would have a partner as his wife is not interested in sexual activity\par \par He has a history of an elevated PSA with been following it over time in the past the 4K score was only 2 but his most recent blood see below are low than November 2019 but higher than June 2019 the question is what we do\par \par He has a question of low testosterone so we ordered that checked as well he is here now for review [Urinary Urgency] : urinary urgency [Urinary Frequency] : urinary frequency [Nocturia] : nocturia [Erectile Dysfunction] : Erectile Dysfunction

## 2021-10-12 NOTE — LETTER BODY
[Dear  ___] : Dear  [unfilled], [Courtesy Letter:] : I had the pleasure of seeing your patient, [unfilled], in my office today. [Please see my note below.] : Please see my note below. [Sincerely,] : Sincerely, [FreeTextEntry2] : Miya Lynch MD\par [address]\par [place] [id number] \par

## 2022-01-06 ENCOUNTER — RESULT REVIEW (OUTPATIENT)
Age: 67
End: 2022-01-06

## 2022-01-06 ENCOUNTER — OUTPATIENT (OUTPATIENT)
Dept: OUTPATIENT SERVICES | Facility: HOSPITAL | Age: 67
LOS: 1 days | Discharge: HOME | End: 2022-01-06
Payer: COMMERCIAL

## 2022-01-06 DIAGNOSIS — R97.20 ELEVATED PROSTATE SPECIFIC ANTIGEN [PSA]: ICD-10-CM

## 2022-01-06 PROCEDURE — 72197 MRI PELVIS W/O & W/DYE: CPT | Mod: 26

## 2022-07-19 ENCOUNTER — APPOINTMENT (OUTPATIENT)
Dept: CARDIOLOGY | Facility: CLINIC | Age: 67
End: 2022-07-19

## 2022-07-19 ENCOUNTER — RESULT CHARGE (OUTPATIENT)
Age: 67
End: 2022-07-19

## 2022-07-19 VITALS
HEART RATE: 70 BPM | OXYGEN SATURATION: 98 % | TEMPERATURE: 96.8 F | RESPIRATION RATE: 16 BRPM | WEIGHT: 176 LBS | BODY MASS INDEX: 27.62 KG/M2 | SYSTOLIC BLOOD PRESSURE: 125 MMHG | HEIGHT: 67 IN | DIASTOLIC BLOOD PRESSURE: 75 MMHG

## 2022-07-19 DIAGNOSIS — G45.9 TRANSIENT CEREBRAL ISCHEMIC ATTACK, UNSPECIFIED: ICD-10-CM

## 2022-07-19 PROCEDURE — 99214 OFFICE O/P EST MOD 30 MIN: CPT | Mod: 25

## 2022-07-19 PROCEDURE — 93000 ELECTROCARDIOGRAM COMPLETE: CPT

## 2022-07-19 RX ORDER — OLMESARTAN MEDOXOMIL 5 MG/1
5 TABLET, FILM COATED ORAL
Qty: 90 | Refills: 0 | Status: ACTIVE | COMMUNITY
Start: 2022-06-28

## 2022-07-19 RX ORDER — ATORVASTATIN CALCIUM 40 MG/1
40 TABLET, FILM COATED ORAL
Qty: 90 | Refills: 0 | Status: ACTIVE | COMMUNITY
Start: 2022-06-28

## 2022-07-19 RX ORDER — ASPIRIN 81 MG/1
81 TABLET, CHEWABLE ORAL
Qty: 30 | Refills: 0 | Status: ACTIVE | COMMUNITY
Start: 2022-05-31

## 2022-07-19 RX ORDER — NALTREXONE HYDROCHLORIDE AND BUPROPION HYDROCHLORIDE 8; 90 MG/1; MG/1
8-90 TABLET, EXTENDED RELEASE ORAL
Qty: 120 | Refills: 0 | Status: ACTIVE | COMMUNITY
Start: 2022-01-21

## 2022-07-19 NOTE — ASSESSMENT
[FreeTextEntry1] : 65 yo male with pmhx and presentation as above\par cad/dyslipidemia/htn\par ? TIA\par obtain records\par neuro consult\par asa/high dose statin/bp mx\par cont with aggressive risk modif\par daily exercise \par diet and act as tolerated\par rtc 3-4 weeks

## 2022-07-19 NOTE — HISTORY OF PRESENT ILLNESS
[FreeTextEntry1] : 67 yo male with pmhx as below is here for a f/up visit\par 2017 s/p lhc - mod 2v cad - lad and rca - med rx\par on high dose statin, 2018 s/p stress mpi - low risk \par et is stable\par ? recent TIA, admitted twice with neg w/up as per pt\par seeing neuro next week\par no major cvs complains\par + weight gain\par ros is otherwise unrem\par complaint with meds and diet

## 2022-07-19 NOTE — REVIEW OF SYSTEMS
[Weight Loss (___ Lbs)] : [unfilled] ~Ulb weight loss [Negative] : Heme/Lymph [FreeTextEntry5] : see hpi [de-identified] : see hpi

## 2022-09-30 ENCOUNTER — APPOINTMENT (OUTPATIENT)
Dept: UROLOGY | Facility: CLINIC | Age: 67
End: 2022-09-30

## 2022-09-30 VITALS
TEMPERATURE: 97.1 F | RESPIRATION RATE: 16 BRPM | HEART RATE: 63 BPM | HEIGHT: 67 IN | WEIGHT: 165 LBS | OXYGEN SATURATION: 98 % | DIASTOLIC BLOOD PRESSURE: 69 MMHG | SYSTOLIC BLOOD PRESSURE: 114 MMHG | BODY MASS INDEX: 25.9 KG/M2

## 2022-09-30 PROCEDURE — 99213 OFFICE O/P EST LOW 20 MIN: CPT

## 2022-09-30 NOTE — LETTER HEADER
[FreeTextEntry3] : Jose Martin López M.D.\par Director of Urology\par [facility]\par [address] [place] [address], [address]\[place] [address]

## 2022-09-30 NOTE — LETTER BODY
[Dear  ___] : Dear  [unfilled], [Courtesy Letter:] : I had the pleasure of seeing your patient, [unfilled], in my office today. [Please see my note below.] : Please see my note below. [Sincerely,] : Sincerely, [FreeTextEntry2] : Miya Lynch MD\par [address]\par [place] [id number] \par

## 2022-09-30 NOTE — HISTORY OF PRESENT ILLNESS
[Urinary Urgency] : urinary urgency [Urinary Frequency] : urinary frequency [Nocturia] : nocturia [Erectile Dysfunction] : Erectile Dysfunction [FreeTextEntry1] : Waqas is a 67-year-old male born on on October 12, 2019 who we have been following for BPH with mild urinary symptoms, erectile dysfunction, and elevated PSA.\par \par His voiding and urinary symptoms are well managed on Cialis daily without any issues.  He takes a 20 mg tablet and cuts it with a pill cutter.  He has normal erections with this medication denies adverse events.\par \par He has a history of elevated PSA, with a 4K score of 2% and a PSA of 4.6.  Recommended MRI.  He obtained MRI however never followed up.  He presents today to review his MRI and even more for a medication refill\par

## 2022-09-30 NOTE — ASSESSMENT
[FreeTextEntry1] : Will obtain repeat PSA today and  unless it is significantly elevated he will follow-up in 6 months for review.  His PSA density is 0.08 and within normal limits.  If PSA is elevated and PSA density of concern we will recommend follow-up regarding biopsy sooner.  Otherwise he will continue with daily Cialis unless there are problems continue as above

## 2022-10-04 ENCOUNTER — NON-APPOINTMENT (OUTPATIENT)
Age: 67
End: 2022-10-04

## 2022-10-04 LAB
PSA FREE FLD-MCNC: 17 %
PSA FREE SERPL-MCNC: 0.73 NG/ML
PSA SERPL-MCNC: 4.28 NG/ML

## 2023-04-10 ENCOUNTER — APPOINTMENT (OUTPATIENT)
Dept: UROLOGY | Facility: CLINIC | Age: 68
End: 2023-04-10
Payer: COMMERCIAL

## 2023-04-10 VITALS
DIASTOLIC BLOOD PRESSURE: 69 MMHG | HEIGHT: 67 IN | RESPIRATION RATE: 16 BRPM | HEART RATE: 72 BPM | TEMPERATURE: 98 F | SYSTOLIC BLOOD PRESSURE: 127 MMHG | BODY MASS INDEX: 25.11 KG/M2 | WEIGHT: 160 LBS

## 2023-04-10 PROCEDURE — 99213 OFFICE O/P EST LOW 20 MIN: CPT

## 2023-04-10 NOTE — HISTORY OF PRESENT ILLNESS
[Urinary Urgency] : urinary urgency [Urinary Frequency] : urinary frequency [Nocturia] : nocturia [Erectile Dysfunction] : Erectile Dysfunction [FreeTextEntry1] : Ryan is a 67-year-old male born 1955 who we have been following for elevated PSA, with mild urinary symptoms with BPH, and erectile dysfunction.\par \par At his last visit on 09/30/2022, he was well managed on Cialis 5 mg p.o. daily, taking 20 tablets and cutting it in quarters. He reports excellent efficacy with this medication with resolution of both his erectile dysfunction and his bothersome lower urinary tract symptoms.\par \par He has a history of elevated PSA with a negative MRI from January 2022.  4K score at the time was 2%.  His prior PSA was 4.28, In September 2022, and improved from prior value of 4.6\par

## 2023-04-10 NOTE — PHYSICAL EXAM

## 2023-04-10 NOTE — LETTER HEADER
[FreeTextEntry3] : Jose Martin López M.D.\par Director of Urology\par [facility]\par [address] [place] [address], [address]\[place] [address]

## 2023-04-10 NOTE — ASSESSMENT
[FreeTextEntry1] : He is doing well with Cialis 5 mg daily and will continue.  His PSA was drawn today and as long as it remains stable he can follow-up in 6 months.  If it increases we will have him see uro-oncology for further evaluation

## 2023-04-10 NOTE — LETTER BODY
[Dear  ___] : Dear  [unfilled], [Courtesy Letter:] : I had the pleasure of seeing your patient, [unfilled], in my office today. [Please see my note below.] : Please see my note below. [Sincerely,] : Sincerely, [FreeTextEntry2] : Miya Lynch MD\par [address]\par [place] [id number] \par

## 2023-04-11 ENCOUNTER — NON-APPOINTMENT (OUTPATIENT)
Age: 68
End: 2023-04-11

## 2023-04-11 LAB
PSA FREE FLD-MCNC: 18 %
PSA FREE SERPL-MCNC: 0.67 NG/ML
PSA SERPL-MCNC: 3.79 NG/ML

## 2023-06-05 ENCOUNTER — APPOINTMENT (OUTPATIENT)
Dept: UROLOGY | Facility: CLINIC | Age: 68
End: 2023-06-05

## 2023-10-13 ENCOUNTER — APPOINTMENT (OUTPATIENT)
Dept: UROLOGY | Facility: CLINIC | Age: 68
End: 2023-10-13

## 2024-01-12 ENCOUNTER — APPOINTMENT (OUTPATIENT)
Dept: CARDIOLOGY | Facility: CLINIC | Age: 69
End: 2024-01-12
Payer: COMMERCIAL

## 2024-01-12 VITALS
HEIGHT: 67 IN | BODY MASS INDEX: 26.37 KG/M2 | HEART RATE: 71 BPM | WEIGHT: 168 LBS | DIASTOLIC BLOOD PRESSURE: 60 MMHG | SYSTOLIC BLOOD PRESSURE: 114 MMHG

## 2024-01-12 PROCEDURE — 99214 OFFICE O/P EST MOD 30 MIN: CPT | Mod: 25

## 2024-01-12 PROCEDURE — 93000 ELECTROCARDIOGRAM COMPLETE: CPT

## 2024-01-14 NOTE — ASSESSMENT
[FreeTextEntry1] : 69 yo male with pmhx and presentation as above cad/dyslipidemia/htn new onset cp ccta to assess for ds progresion asa/high dose statin/bp mx cont with aggressive risk modif daily exercise diet and act as tolerated rtc after ccta

## 2024-01-14 NOTE — HISTORY OF PRESENT ILLNESS
[FreeTextEntry1] : 69 yo male with pmhx as below is here for a f/up visit after 2 year hiatus 2017 s/p lhc - mod 2v cad - lad and rca - med rx on high dose statin, 2018 s/p stress mpi - low risk  et is stable new onset exertional cp ros is otherwise unrem complaint with meds and diet

## 2024-01-14 NOTE — REVIEW OF SYSTEMS
[Weight Loss (___ Lbs)] : [unfilled] ~Ulb weight loss [Negative] : Heme/Lymph [FreeTextEntry5] : see hpi [de-identified] : see hpi

## 2024-04-08 ENCOUNTER — APPOINTMENT (OUTPATIENT)
Dept: UROLOGY | Facility: CLINIC | Age: 69
End: 2024-04-08
Payer: COMMERCIAL

## 2024-04-08 VITALS
DIASTOLIC BLOOD PRESSURE: 80 MMHG | HEART RATE: 90 BPM | WEIGHT: 173 LBS | SYSTOLIC BLOOD PRESSURE: 144 MMHG | HEIGHT: 67 IN | BODY MASS INDEX: 27.15 KG/M2

## 2024-04-08 DIAGNOSIS — R35.1 BENIGN PROSTATIC HYPERPLASIA WITH LOWER URINARY TRACT SYMPMS: ICD-10-CM

## 2024-04-08 DIAGNOSIS — R97.20 ELEVATED PROSTATE, SPECIFIC ANTIGEN [PSA]: ICD-10-CM

## 2024-04-08 DIAGNOSIS — R35.1 NOCTURIA: ICD-10-CM

## 2024-04-08 DIAGNOSIS — N52.9 MALE ERECTILE DYSFUNCTION, UNSPECIFIED: ICD-10-CM

## 2024-04-08 DIAGNOSIS — N40.1 BENIGN PROSTATIC HYPERPLASIA WITH LOWER URINARY TRACT SYMPMS: ICD-10-CM

## 2024-04-08 PROCEDURE — G2211 COMPLEX E/M VISIT ADD ON: CPT

## 2024-04-08 PROCEDURE — 99213 OFFICE O/P EST LOW 20 MIN: CPT

## 2024-04-08 NOTE — LETTER BODY
[Dear  ___] : Dear  [unfilled], [Courtesy Letter:] : I had the pleasure of seeing your patient, [unfilled], in my office today. [Please see my note below.] : Please see my note below. [Sincerely,] : Sincerely, [FreeTextEntry2] : Miya Lynch MD\par  [address]\par  [place] [id number] \par

## 2024-04-08 NOTE — HISTORY OF PRESENT ILLNESS
[Urinary Urgency] : urinary urgency [Urinary Frequency] : urinary frequency [Nocturia] : nocturia [Erectile Dysfunction] : Erectile Dysfunction [FreeTextEntry1] : Ryan is a 68-year-old male, Born on August 16, 1955, last seen April 10, 2023, who we have been following for bothersome lower urinary tract symptoms, BPH, and erectile dysfunction.  Currently, he is well-managed on Cialis 5 mg p.o. daily with good efficacy and without adverse events.  He reports significant Improvement in his voiding with this medication however, he is interested in something more definitive such as aquablation.  Additionally he has a history of elevated PSA.  Prior 4K score was 2% and his PSA was as high as 4.6 in the past.  His most recent PSA, from 11/15/2023, is 2.63 and significant improved from prior value of 3.79 from April 2023.

## 2024-04-08 NOTE — ASSESSMENT
[FreeTextEntry1] : His PSA has significantly improved.  He will obtain a PSA now and follow-up with  to discuss aqua ablation.  Continue Cialis 5 p.o. daily.  All usage, dosage, mechanism of action, and adverse events reviewed.

## 2024-04-08 NOTE — LETTER HEADER
[FreeTextEntry3] : Jose Martin López MD [facility], [address] [place] [address]

## 2024-04-25 RX ORDER — METOPROLOL TARTRATE 50 MG/1
50 TABLET, FILM COATED ORAL
Qty: 2 | Refills: 0 | Status: ACTIVE | COMMUNITY
Start: 2024-01-12 | End: 1900-01-01

## 2024-04-30 ENCOUNTER — OUTPATIENT (OUTPATIENT)
Dept: OUTPATIENT SERVICES | Facility: HOSPITAL | Age: 69
LOS: 1 days | End: 2024-04-30
Payer: COMMERCIAL

## 2024-04-30 ENCOUNTER — RESULT REVIEW (OUTPATIENT)
Age: 69
End: 2024-04-30

## 2024-04-30 DIAGNOSIS — I20.9 ANGINA PECTORIS, UNSPECIFIED: ICD-10-CM

## 2024-04-30 DIAGNOSIS — I25.9 CHRONIC ISCHEMIC HEART DISEASE, UNSPECIFIED: ICD-10-CM

## 2024-04-30 DIAGNOSIS — Z00.8 ENCOUNTER FOR OTHER GENERAL EXAMINATION: ICD-10-CM

## 2024-04-30 PROCEDURE — 75574 CT ANGIO HRT W/3D IMAGE: CPT

## 2024-04-30 PROCEDURE — 75574 CT ANGIO HRT W/3D IMAGE: CPT | Mod: 26

## 2024-05-01 DIAGNOSIS — I25.9 CHRONIC ISCHEMIC HEART DISEASE, UNSPECIFIED: ICD-10-CM

## 2024-05-01 DIAGNOSIS — I20.9 ANGINA PECTORIS, UNSPECIFIED: ICD-10-CM

## 2024-05-06 ENCOUNTER — RESULT REVIEW (OUTPATIENT)
Age: 69
End: 2024-05-06

## 2024-05-07 ENCOUNTER — OUTPATIENT (OUTPATIENT)
Dept: OUTPATIENT SERVICES | Facility: HOSPITAL | Age: 69
LOS: 1 days | End: 2024-05-07
Payer: COMMERCIAL

## 2024-05-07 DIAGNOSIS — R93.1 ABNORMAL FINDINGS ON DIAGNOSTIC IMAGING OF HEART AND CORONARY CIRCULATION: ICD-10-CM

## 2024-05-07 PROCEDURE — 75580 N-INVAS EST C FFR SW ALY CTA: CPT | Mod: 26

## 2024-05-07 PROCEDURE — 75580 N-INVAS EST C FFR SW ALY CTA: CPT

## 2024-05-08 DIAGNOSIS — R93.1 ABNORMAL FINDINGS ON DIAGNOSTIC IMAGING OF HEART AND CORONARY CIRCULATION: ICD-10-CM

## 2024-05-28 ENCOUNTER — APPOINTMENT (OUTPATIENT)
Dept: CARDIOLOGY | Facility: CLINIC | Age: 69
End: 2024-05-28
Payer: COMMERCIAL

## 2024-05-28 VITALS
HEART RATE: 92 BPM | SYSTOLIC BLOOD PRESSURE: 110 MMHG | WEIGHT: 166 LBS | DIASTOLIC BLOOD PRESSURE: 68 MMHG | HEIGHT: 67 IN | BODY MASS INDEX: 26.06 KG/M2

## 2024-05-28 DIAGNOSIS — I25.9 CHRONIC ISCHEMIC HEART DISEASE, UNSPECIFIED: ICD-10-CM

## 2024-05-28 DIAGNOSIS — E78.2 MIXED HYPERLIPIDEMIA: ICD-10-CM

## 2024-05-28 DIAGNOSIS — I10 ESSENTIAL (PRIMARY) HYPERTENSION: ICD-10-CM

## 2024-05-28 DIAGNOSIS — I20.9 ANGINA PECTORIS, UNSPECIFIED: ICD-10-CM

## 2024-05-28 PROCEDURE — 93000 ELECTROCARDIOGRAM COMPLETE: CPT

## 2024-05-28 PROCEDURE — 99214 OFFICE O/P EST MOD 30 MIN: CPT | Mod: 25

## 2024-05-28 RX ORDER — TADALAFIL 20 MG/1
20 TABLET, FILM COATED ORAL
Refills: 0 | Status: ACTIVE | COMMUNITY

## 2024-05-28 NOTE — HISTORY OF PRESENT ILLNESS
[FreeTextEntry1] : 67 yo male with pmhx as below is here for a f/up visit after 2 year hiatus 2017 s/p lhc - mod 2v cad - lad and rca - med rx on high dose statin, 2018 s/p stress mpi - low risk  et is stable new onset exertional cp s/p ccta ros is otherwise unrem complaint with meds and diet

## 2024-05-28 NOTE — ASSESSMENT
[FreeTextEntry1] : 67 yo male with pmhx and presentation as above cad/dyslipidemia/htn ccta /ctffr reviewed and d/w the pt asa/high dose statin/bp mx cont with aggressive risk modif daily exercise diet and act as tolerated repeat labs rtc 6 months

## 2024-05-28 NOTE — REVIEW OF SYSTEMS
[Weight Loss (___ Lbs)] : [unfilled] ~Ulb weight loss [Negative] : Heme/Lymph [FreeTextEntry5] : see hpi [de-identified] : see hpi

## 2024-06-20 RX ORDER — TADALAFIL 10 MG/1
10 TABLET, FILM COATED ORAL
Qty: 30 | Refills: 6 | Status: ACTIVE | COMMUNITY
Start: 2024-06-20 | End: 1900-01-01

## 2025-05-22 RX ORDER — TADALAFIL 20 MG/1
20 TABLET ORAL
Qty: 15 | Refills: 6 | Status: ACTIVE | COMMUNITY
Start: 2025-05-22 | End: 1900-01-01

## 2025-05-27 ENCOUNTER — APPOINTMENT (OUTPATIENT)
Dept: CARDIOLOGY | Facility: CLINIC | Age: 70
End: 2025-05-27